# Patient Record
Sex: FEMALE | Race: BLACK OR AFRICAN AMERICAN
[De-identification: names, ages, dates, MRNs, and addresses within clinical notes are randomized per-mention and may not be internally consistent; named-entity substitution may affect disease eponyms.]

---

## 2018-01-07 ENCOUNTER — HOSPITAL ENCOUNTER (EMERGENCY)
Dept: HOSPITAL 74 - JER | Age: 53
Discharge: HOME | End: 2018-01-07
Payer: COMMERCIAL

## 2018-01-07 VITALS — DIASTOLIC BLOOD PRESSURE: 67 MMHG | SYSTOLIC BLOOD PRESSURE: 116 MMHG | HEART RATE: 84 BPM

## 2018-01-07 VITALS — BODY MASS INDEX: 25.7 KG/M2

## 2018-01-07 VITALS — TEMPERATURE: 97.8 F

## 2018-01-07 DIAGNOSIS — E11.9: ICD-10-CM

## 2018-01-07 DIAGNOSIS — Z79.84: ICD-10-CM

## 2018-01-07 DIAGNOSIS — Z87.09: ICD-10-CM

## 2018-01-07 DIAGNOSIS — R13.13: Primary | ICD-10-CM

## 2018-01-07 DIAGNOSIS — R09.89: ICD-10-CM

## 2018-01-07 LAB
ALBUMIN SERPL-MCNC: 3.4 G/DL (ref 3.4–5)
ALP SERPL-CCNC: 94 U/L (ref 45–117)
ALT SERPL-CCNC: 18 U/L (ref 12–78)
ANION GAP SERPL CALC-SCNC: 8 MMOL/L (ref 8–16)
AST SERPL-CCNC: 14 U/L (ref 15–37)
BASOPHILS # BLD: 0.7 % (ref 0–2)
BILIRUB SERPL-MCNC: 0.5 MG/DL (ref 0.2–1)
BUN SERPL-MCNC: 14 MG/DL (ref 7–18)
CALCIUM SERPL-MCNC: 8.6 MG/DL (ref 8.5–10.1)
CHLORIDE SERPL-SCNC: 99 MMOL/L (ref 98–107)
CO2 SERPL-SCNC: 30 MMOL/L (ref 21–32)
CREAT SERPL-MCNC: 0.8 MG/DL (ref 0.55–1.02)
DEPRECATED RDW RBC AUTO: 12.3 % (ref 11.6–15.6)
EOSINOPHIL # BLD: 1 % (ref 0–4.5)
GLUCOSE SERPL-MCNC: 237 MG/DL (ref 74–106)
HCT VFR BLD CALC: 40 % (ref 32.4–45.2)
HGB BLD-MCNC: 13.4 GM/DL (ref 10.7–15.3)
LYMPHOCYTES # BLD: 39 % (ref 8–40)
MCH RBC QN AUTO: 29.5 PG (ref 25.7–33.7)
MCHC RBC AUTO-ENTMCNC: 33.4 G/DL (ref 32–36)
MCV RBC: 88.4 FL (ref 80–96)
MONOCYTES # BLD AUTO: 6.2 % (ref 3.8–10.2)
NEUTROPHILS # BLD: 53.1 % (ref 42.8–82.8)
PLATELET # BLD AUTO: 233 K/MM3 (ref 134–434)
PMV BLD: 9.4 FL (ref 7.5–11.1)
POTASSIUM SERPLBLD-SCNC: 3.8 MMOL/L (ref 3.5–5.1)
PROT SERPL-MCNC: 7 G/DL (ref 6.4–8.2)
RBC # BLD AUTO: 4.52 M/MM3 (ref 3.6–5.2)
SODIUM SERPL-SCNC: 137 MMOL/L (ref 136–145)
WBC # BLD AUTO: 9.3 K/MM3 (ref 4–10)

## 2018-01-07 NOTE — DS
Physical Exam: 


SUBJECTIVE: Patient seen and examined





Pt is able to swallow and is breathing freely at this time, though she has a 

persistent feeling of something in her throat. She denies any other episodes of 

dysphagia before, ever having this type of sausage before, any other food 

allergies, headache, chest pain, abdominal pain, diarrhea, constipation, and 

dysuria. 





OBJECTIVE:





 Vital Signs











 Period  Temp  Pulse  Resp  BP Sys/Mccarthy  Pulse Ox


 


 Last 24 Hr  97.8 F  82-98  16-18  116-145/67-84  








PHYSICAL EXAM





GENERAL: Awake, alert, and fully oriented, in no acute distress.


HEENT: no pharyngeal erthema, no tongue, uvula or tonsillary swelling


NECK: Normal range of motion, supple without lymphadenopathy, JVD, or masses.


LUNGS: Breath sounds equal, clear to auscultation bilaterally. No wheezes, and 

no crackles. No accessory muscle use.


HEART: Regular rate and rhythm, normal S1 and S2 without murmur, rub or gallop.


ABDOMEN: Soft, nontender, not distended, normoactive bowel sounds, no guarding, 

no rebound, no masses.  No hepatomegaly or  splenomegaly. 


MUSCULOSKELETAL: Normal range of motion at all joints. No bony deformities or 

tenderness. No CVA tenderness.


UPPER EXTREMITIES: 2+ pulses, warm, well-perfused. No cyanosis. No clubbing. No 

peripheral edema.


LOWER EXTREMITIES: 2+ pulses, warm, well-perfused. No calf tenderness. No 

peripheral edema. 


NEUROLOGICAL:  Cranial nerves II-XII intact. Normal speech. Normal gait.





LABS


 Laboratory Results - last 24 hr











  01/07/18 01/07/18





  03:40 03:40


 


WBC  9.3 


 


RBC  4.52 


 


Hgb  13.4 


 


Hct  40.0 


 


MCV  88.4 


 


MCH  29.5 


 


MCHC  33.4 


 


RDW  12.3 


 


Plt Count  233 


 


MPV  9.4 


 


Neutrophils %  53.1 


 


Lymphocytes %  39.0 


 


Monocytes %  6.2 


 


Eosinophils %  1.0 


 


Basophils %  0.7 


 


Sodium   137


 


Potassium   3.8


 


Chloride   99


 


Carbon Dioxide   30


 


Anion Gap   8


 


BUN   14


 


Creatinine   0.8


 


Creat Clearance w eGFR   > 60


 


Random Glucose   237 H


 


Calcium   8.6


 


Total Bilirubin   0.5


 


AST   14 L


 


ALT   18


 


Alkaline Phosphatase   94


 


Total Protein   7.0


 


Albumin   3.4








CT scan of the neck without intravenous contrast:





Contiguous axial scans were obtained followed by coronal and sagittal 

reconstruction images. No radiopaque foreign body is seen. The airway is patent 

and symmetric without narrowing. Parapharyngeal space is clear. There are a 

couple of air pockets are seen in the soft tissue along the right lateral wall 

at the level of the piriform sinus/supralaryngeal level. It is unclear whether 

this is air within the effaced right. Form sinus versus extraluminal air. The 

thyroid gland is within normal limits in size. No discrete mass lesion or 

enlarged lymph nodes are identified. Both submandibular and both parotid glands 

appear unremarkable. Included intracranial contents appear grossly 

unremarkable. Both orbits appear unremarkable. Included thoracic inlet appears 

unremarkable. Straightening of the cervical spine with moderate degenerative 

disc disease at C5-C6 level with right uncovertebral hypertrophy moderately 

narrowing the right foramen. Mild to moderate degenerative disc disease at C6-

C7 level with mild bilateral uncovertebral hypertrophy Impression: No 

radiopaque foreign body is seen. There is no gross narrowing of the airway. 

There are a few air pockets in the right lateral supralaryngeal wall. It is 

unclear whether it represent air within the effaced right piriform sinus versus 

extraluminal air. Follow-up and ENT evaluation is needed. Mild air distention 

of the proximal esophagus, which is nonspecific. Correlate clinically for 

further evaluation. A preliminary report was forwarded by the [facility] service

, IMAGING ON CALL. 








HOSPITAL COURSE:





Date of Admission:01/07/18





Date of Discharge: 01/07/18





52F with hx of asthma and NIDDM (on metformin and glyburide) who presents after 

acute episode of dysphagia after eating sausage accompanied by emesis and SOB. 

The dysphagia resolved after several episodes of emesis which ejected the 

sausage completely out of her throat. Given the CT findings, ENT was consulted 

who stated that this is not urgent, and pt can be seen as an outpatient in 2 

days.





Pt is hemodynamically stable, has no further subjective complaints, has a 

normal physical exam, and is ready for discharge home to f/ with ENT in 2 

days. 





Case discussed with attending, Dr. Rich.





-Edmond Marcum MD PGY1


Minutes to complete discharge: 35





<Edmond Marcum - Last Filed: 01/07/18 13:49>


Physical Exam: 


SUBJECTIVE: Patient seen and examined, agree with the resident's note























<Valerie Rich - Last Filed: 01/08/18 13:17>





Discharge Summary


Reason For Visit: THROAT PAIN





- Home Medications


Comprehensive Discharge Medication List: 


Ambulatory Orders





Aspirin [ASA -] 81 mg PO DAILY 01/07/18 


Glyburide 5 mg PO QID 01/07/18 


Metformin HCl [Metformin HCl ER] 1,000 mg PO BID 01/07/18 











<Edmond Marcum - Last Filed: 01/07/18 13:49>





- Home Medications


Comprehensive Discharge Medication List: 


Ambulatory Orders





Aspirin [ASA -] 81 mg PO DAILY 01/07/18 


Glyburide 5 mg PO QID 01/07/18 


Metformin HCl [Metformin HCl ER] 1,000 mg PO BID 01/07/18 











<Valerie Rich - Last Filed: 01/08/18 13:17>


Condition: Guarded





- Instructions


Diet, Activity, Other Instructions: 


Follow up with ENT within next 2 days


Come back to the Emergency Department for any new, concerning or worsening 

symptom like difficulty breathing, swallowing, fever, chest pain...


Referrals: 


Ronn Erickson MD [Staff Physician] - 


Disposition: HOME


This patient is new to me today: Yes


Date on this admission: 01/07/18


Emergency Visit: Yes


Care time: The patient presented to the Emergency Department on the above date 

and was hospitalized for further evaluation of their emergent condition.


Critical Care patient: No





- Discharge Referral


Referred to [facility] No





<Edmond Marcum - Last Filed: 01/07/18 13:49>

## 2018-01-07 NOTE — PN
Teaching Attending Note


Name of Resident: Edmond Marcum





ATTENDING PHYSICIAN STATEMENT





I saw and evaluated the patient.


I reviewed the resident's note and discussed the case with the resident.


I agree with the resident's findings and plan as documented.








SUBJECTIVE:


Patient is feeling better, stated that she had a big piece of sausage that was 

stuck in her throat. Tried to drink water to push the sausage down her throat 

but instead she was throwing up. She decided to come to Ed.for further care. 

Patient was seen by ED.doctor ordered CT of the neck which was reported 

negative. ENT was called for the patient to be evaluated. As per ENT patient 

can go home and follow with the office on Monday. Patient was discharged home 

from ED. by the ED. doctor after the discussion with radiologist and ENT 

doctor. 





OBJECTIVE:





 Vital Signs











Temperature  97.8 F   01/07/18 00:13


 


Pulse Rate  84   01/07/18 09:00


 


Respiratory Rate  16   01/07/18 00:13


 


Blood Pressure  116/67   01/07/18 09:00


 


O2 Sat by Pulse Oximetry (%)  98   01/07/18 09:00











 CBCD











WBC  9.3 K/mm3 (4.0-10.0)   01/07/18  03:40    


 


RBC  4.52 M/mm3 (3.60-5.2)   01/07/18  03:40    


 


Hgb  13.4 GM/dL (10.7-15.3)   01/07/18  03:40    


 


Hct  40.0 % (32.4-45.2)   01/07/18  03:40    


 


MCV  88.4 fl (80-96)   01/07/18  03:40    


 


MCHC  33.4 g/dl (32.0-36.0)   01/07/18  03:40    


 


RDW  12.3 % (11.6-15.6)   01/07/18  03:40    


 


Plt Count  233 K/MM3 (134-434)   01/07/18  03:40    


 


MPV  9.4 fl (7.5-11.1)   01/07/18  03:40    








 CMP











Sodium  137 mmol/L (136-145)   01/07/18  03:40    


 


Potassium  3.8 mmol/L (3.5-5.1)   01/07/18  03:40    


 


Chloride  99 mmol/L ()   01/07/18  03:40    


 


Carbon Dioxide  30 mmol/L (21-32)   01/07/18  03:40    


 


Anion Gap  8  (8-16)   01/07/18  03:40    


 


BUN  14 mg/dL (7-18)   01/07/18  03:40    


 


Creatinine  0.8 mg/dL (0.55-1.02)   01/07/18  03:40    


 


Creat Clearance w eGFR  > 60  (>60)   01/07/18  03:40    


 


Random Glucose  237 mg/dL ()  H  01/07/18  03:40    


 


Calcium  8.6 mg/dL (8.5-10.1)   01/07/18  03:40    


 


Total Bilirubin  0.5 mg/dL (0.2-1.0)   01/07/18  03:40    


 


AST  14 U/L (15-37)  L  01/07/18  03:40    


 


ALT  18 U/L (12-78)   01/07/18  03:40    


 


Alkaline Phosphatase  94 U/L ()   01/07/18  03:40    


 


Total Protein  7.0 g/dl (6.4-8.2)   01/07/18  03:40    


 


Albumin  3.4 g/dl (3.4-5.0)   01/07/18  03:40    











 Home Medications











 Medication  Instructions  Recorded


 


Aspirin [ASA -] 81 mg PO DAILY 01/07/18


 


Glyburide 5 mg PO QID 01/07/18


 


Metformin HCl [Metformin HCl ER] 1,000 mg PO BID 01/07/18








PE: 


Patient has no difficulty swallowing, No swelling of her throat is noted. 


rest of PE per resident's note.





ASSESSMENT AND PLAN:


Patient is comfortable , advaced the  diet, patient tolerated the diet and was 

discharged home with follow up visit with ENT on Monday the 8th 2018. CT of the 

neck reviewed By Dr.Farid Guzman.





Follow with ENT Dr.Tom Brody on 1/8/2018

## 2018-01-07 NOTE — PDOC
History of Present Illness





- General


Chief Complaint: Sore Throat


Stated Complaint: THROAT PAIN


Time Seen by Provider: 01/07/18 00:52





- History of Present Illness


Initial Comments: 





01/07/18 01:18


Pt is a 53 y/o F with PMH Asthma, NIDDM on metformin who presents to the ED 

approximately 2-3 hours after an episode in which she had food stuck in her 

throat. Pt states she was eating sausage when a piece became lodged in her 

throat. She vomited 2-3 times over a period of 5 min before expelling the 

pieces. During the episode, pt states she felt like her throat was closing and 

she had trouble breathing.


Pt has had recurrent bouts of NBNB vomiting (3-4 times) since the episode. Pt 

is able to swallow and is breathing freely at this time, though she has a 

persistent feeling of something in her throat.

















Past History





- Past Medical History


Allergies/Adverse Reactions: 


 Allergies











Allergy/AdvReac Type Severity Reaction Status Date / Time


 


Penicillins Allergy   Verified 01/07/18 01:23














- Suicide/Smoking/Psychosocial Hx


Smoking History: Unknown if ever smoked


Information on smoking cessation initiated: No


Hx Alcohol Use: No


Drug/Substance Use Hx: No





**Review of Systems





- Review of Systems


Able to Perform ROS?: Yes


Is the patient limited English proficient: No


Constitutional: Yes: Symptoms Reported.  No: Chills, Diaphoresis, Fever


HEENTM: Yes: Symptoms Reported.  No: Eye Pain, Blurred Vision


Respiratory: Yes: Symptoms reported.  No: Cough, Orthopnea, Shortness of Breath


Cardiac (ROS): Yes: Symptoms Reported.  No: Chest Pain, Edema, Chest Tightness


ABD/GI: Yes: Symptoms Reported, See HPI, Difficulty Swallowing, Nausea, 

Vomiting.  No: Indigestion


: Yes: Symptoms Reported.  No: Burning, Dysuria, Discharge


Musculoskeletal: Yes: Symptoms Reported.  No: Back Pain





*Physical Exam





- Vital Signs


 Last Vital Signs











Temp Pulse Resp BP Pulse Ox


 


 97.8 F   82   16   145/71   100 


 


 01/07/18 00:13  01/07/18 00:13  01/07/18 00:13  01/07/18 00:13  01/07/18 00:13














- Physical Exam


General Appearance: Yes: Nourished, Appropriately Dressed.  No: Apparent 

Distress


HEENT: positive: EOMI, KISHOR, Muffled/Hoarse voice.  negative: Normal ENT 

Inspection, Pharynx Normal (swollen pharynx. Uvula resting on tongue)


Neck: positive: Supple.  negative: Tender, Stridor


Respiratory/Chest: positive: Normal Breath Sounds.  negative: Chest Tender, 

Lungs Clear


Cardiovascular: positive: Regular Rhythm, Regular Rate, S1, S2


Vascular Pulses: Dorsalis-Pedis (R): 2+, Doralis-Pedis (L): 2+


Gastrointestinal/Abdominal: positive: Normal Bowel Sounds, Flat, Soft.  negative

: Tender, Organomegaly


Musculoskeletal: positive: Normal Inspection.  negative: CVA Tenderness


Neurologic: positive: CNs II-XII NML intact, Fully Oriented, Alert, Normal Mood/

Affect





ED Treatment Course





- LABORATORY


CBC & Chemistry Diagram: 


 01/07/18 03:40





 01/07/18 03:40





Medical Decision Making





- Medical Decision Making





01/07/18 01:34





Pt is a 53 y/o F w/ PMH Asthma, NIDDM who present to ED several hours after an 

episode in which she had soft food stuck in her throat persistently for 5 min. 

Pt was able to expel the food, but has residual symptoms of altered voice, 

vomiting, and throat swelling.





Plan


-CT neck soft tissue











01/07/18 04:05


CT appears abnormal. Possible tracheal narrowing, ?gas in soft tissue. Possible 

infectious etiolgoy. Will confirm with radiology.


Pt may need ENT consult. 








01/07/18 05:39


Pt sleeping comfortably








01/07/18 06:41


Labs unremarkable.











*DC/Admit/Observation/Transfer





- Discharge Dispostion


Condition at time of disposition: Guarded





- Referrals





- Patient Instructions





- Post Discharge Activity

## 2018-01-07 NOTE — PDOC
*Physical Exam





- Vital Signs


 Last Vital Signs











Temp Pulse Resp BP Pulse Ox


 


 97.8 F   84   16   116/67   98 


 


 01/07/18 00:13  01/07/18 09:00  01/07/18 00:13  01/07/18 09:00  01/07/18 09:00














ED Treatment Course





- LABORATORY


CBC & Chemistry Diagram: 


 01/07/18 03:40





 01/07/18 03:40





- ADDITIONAL ORDERS


Additional order review: 


 Laboratory  Results











  01/07/18





  03:40


 


Sodium  137


 


Potassium  3.8


 


Chloride  99


 


Carbon Dioxide  30


 


Anion Gap  8


 


BUN  14


 


Creatinine  0.8


 


Creat Clearance w eGFR  > 60


 


Random Glucose  237 H


 


Calcium  8.6


 


Total Bilirubin  0.5


 


AST  14 L


 


ALT  18


 


Alkaline Phosphatase  94


 


Total Protein  7.0


 


Albumin  3.4








 











  01/07/18





  03:40


 


RBC  4.52


 


MCV  88.4


 


MCHC  33.4


 


RDW  12.3


 


MPV  9.4


 


Neutrophils %  53.1


 


Lymphocytes %  39.0


 


Monocytes %  6.2


 


Eosinophils %  1.0


 


Basophils %  0.7














Medical Decision Making





- Medical Decision Making





01/07/18 09:40


Official soft tissue neck CT: There may be minimal parapharyngeal space edema 

at and just above the level of the vocal cords.


Intraorbital and visualized contents are normal. No esophageal or tracheal 

foreign foreign bodies identified. No mass or abscess. Normal salivary glands 

and thyroid gland. Lungs are clear. Moderate degenerative changes of the 

cervical spine.


ENT on call consulted, patient can followed outpatient. 


01/07/18 09:42


No apparent soft tissue air as was communicated at sign out. Patient comfortable

, able to breathe, hungry, passed PO challenge. 


Will follow up with ENT outpatient. 





01/07/18 09:51





01/07/18 09:59


Patient placed on ED obs short stay. 





*DC/Admit/Observation/Transfer


Diagnosis at time of Disposition: 


 Pharyngeal edema








- Discharge Dispostion


Disposition: HOME


Condition at time of disposition: Guarded


Admit: Yes





- Referrals


Referrals: 


Ronn Erickson MD [Staff Physician] - 





- Patient Instructions


Printed Discharge Instructions:  DI for Oropharyngeal Dysphagia


Additional Instructions: 


Follow up with ENT within next 2 days


Come back to the Emergency Department for any new, concerning or worsening 

symptom like difficulty breathing, swallowing, fever, chest pain...





- Post Discharge Activity


Activity Comments: 





01/07/18 10:02


Short STay

## 2018-01-07 NOTE — PDOC
Attending Attestation





- Resident


Resident Name: Jaime Doherty





- ED Attending Attestation


I have performed the following: I have examined & evaluated the patient, The 

case was reviewed & discussed with the resident, I agree w/resident's findings 

& plan





- HPI


HPI: 





01/07/18 03:38


Pt choked on a sausage.  Spit up some of it at home; drank warm water to push 

it brian; choked on that and spit up more sausage.  Felt a FB in throat and came 

to the ER.  Here she vomited the rest of the sausage out.





- Physicial Exam


PE: 





01/07/18 05:13


Agree with resident exam.





- Medical Decision Making





01/07/18 03:33


Patient Name: JIHAN MCCARTY


THIS IS A PRELIMINARY REPORT FROM IMAGING ON CALL


DATE OF SERVICE: 2018-01-07 02:15:47


IMAGES: [id number]


EXAM: CT SOFT TISSUE NECK WITHOUT CONTRAST


HISTORY: Choked on sausage


COMPARISON: None.


FINDINGS: Intraorbital and visualized intracranial contents are normal. Normal 

epiglottis and vocal


cords. No parapharyngeal or retropharyngeal space edema. No esophageal or 

tracheal foreign


bodies identified. No mass or abscess. Normal salivary glands and thyroid 

gland. Lungs are clear.


There are moderate degenerative changes of the cervical spine.


IMPRESSION: No acute abnormalities identified


01/07/18 05:11


Pt has swelling at the level of the eppiglottis.  Images discussed with imaging 

on call, they agree that perhaps there may be some swelling in the area.


Pt will be signed out to the day team and ENT can be called to scope the 

patient.


Labs pending.


01/07/18 06:40


Labs normal. Pt will be signed out to the day team.

## 2018-01-07 NOTE — HP
CHIEF COMPLAINT: dysphagia





HISTORY OF PRESENT ILLNESS:





52F with hx of asthma and NIDDM (on metformin and glyburide), who presents to 

the ED approximately 2-3 hours after an episode in which she had food stuck in 

her throat. Pt states she was eating sausage when a piece became lodged in her 

throat. She vomited 2-3 times over a period of 5 min before expelling the 

pieces. During the episode, pt states she felt like her throat was closing and 

she had trouble breathing. Pt has had recurrent bouts of NBNB vomiting (3-4 

times) since the episode, which ejected the remainder of the sausage. Pt is 

able to swallow and is breathing freely at this time, though she has a 

persistent feeling of something in her throat. She denies any other episodes of 

dysphagia before, ever having this type of sausage before, any other food 

allergies, headache, chest pain, abdominal pain, diarrhea, constipation, and 

dysuria. 





ER course was notable for:


(1) CT findings





Recent Travel: denies





PAST MEDICAL HISTORY:





asthma and NIDDM





PAST SURGICAL HISTORY:





denies





Social History:


Smoking: denies


Alcohol: denies


Drugs: denies





Family History:





non-contributory





Allergies





Penicillins Allergy (Verified 01/07/18 01:23)


 


HOME MEDICATIONS:


 Home Medications











 Medication  Instructions  Recorded


 


Aspirin [ASA -] 81 mg PO DAILY 01/07/18


 


Glyburide 5 mg PO QID 01/07/18


 


Metformin HCl [Metformin HCl ER] 1,000 mg PO BID 01/07/18








REVIEW OF SYSTEMS


CONSTITUTIONAL: 


Absent:  fever, chills, diaphoresis, generalized weakness, malaise, loss of 

appetite, weight change


HEENT: 


Absent:  rhinorrhea, nasal congestion, mouth swelling, ear pain, eye pain, 

visual changes


present: throat swelling, difficulty swallowing


CARDIOVASCULAR: 


Absent: chest pain, syncope, palpitations, irregular heart rate, lightheadedness

, peripheral edema


RESPIRATORY: 


Absent: cough, dyspnea with exertion, orthopnea, wheezing, stridor, hemoptysis


present: SOB


GASTROINTESTINAL:


Absent: abdominal pain, abdominal distension, diarrhea, constipation, melena, 

hematochezia


present: nausea, emesis


GENITOURINARY: 


Absent: dysuria, frequency, urgency, hesitancy, hematuria, flank pain, genital 

pain


MUSCULOSKELETAL: 


Absent: myalgia, arthralgia, joint swelling, back pain, neck pain


SKIN: 


Absent: rash, itching, pallor


HEMATOLOGIC/IMMUNOLOGIC: 


Absent: easy bleeding, easy bruising, lymphadenopathy, frequent infections


ENDOCRINE:


Absent: unexplained weight gain, unexplained weight loss, heat intolerance, 

cold intolerance


NEUROLOGIC: 


Absent: headache, focal weakness or paresthesias, dizziness, unsteady gait, 

seizure, mental status changes, bladder or bowel incontinence


PSYCHIATRIC: 


Absent: anxiety, depression, suicidal or homicidal ideation, hallucinations.








PHYSICAL EXAMINATION


 Vital Signs - 24 hr











  01/07/18 01/07/18





  00:08 00:13


 


Temperature  97.8 F


 


Pulse Rate  82


 


Pulse Rate [ 98 H 





Left Radial]  


 


Respiratory 18 16





Rate  


 


Blood Pressure  145/71


 


Blood Pressure 136/84 





[Left Arm]  


 


O2 Sat by Pulse 98 100





Oximetry (%)  











GENERAL: Awake, alert, and fully oriented, in no acute distress.


HEENT: no pharyngeal erthema, no tongue, uvula or tonsillary swelling


NECK: Normal range of motion, supple without lymphadenopathy, JVD, or masses.


LUNGS: Breath sounds equal, clear to auscultation bilaterally. No wheezes, and 

no crackles. No accessory muscle use.


HEART: Regular rate and rhythm, normal S1 and S2 without murmur, rub or gallop.


ABDOMEN: Soft, nontender, not distended, normoactive bowel sounds, no guarding, 

no rebound, no masses.  No hepatomegaly or  splenomegaly. 


MUSCULOSKELETAL: Normal range of motion at all joints. No bony deformities or 

tenderness. No CVA tenderness.


UPPER EXTREMITIES: 2+ pulses, warm, well-perfused. No cyanosis. No clubbing. No 

peripheral edema.


LOWER EXTREMITIES: 2+ pulses, warm, well-perfused. No calf tenderness. No 

peripheral edema. 


NEUROLOGICAL:  Cranial nerves II-XII intact. Normal speech. Normal gait.


 


 Laboratory Results - last 24 hr











  01/07/18 01/07/18





  03:40 03:40


 


WBC  9.3 


 


RBC  4.52 


 


Hgb  13.4 


 


Hct  40.0 


 


MCV  88.4 


 


MCH  29.5 


 


MCHC  33.4 


 


RDW  12.3 


 


Plt Count  233 


 


MPV  9.4 


 


Neutrophils %  53.1 


 


Lymphocytes %  39.0 


 


Monocytes %  6.2 


 


Eosinophils %  1.0 


 


Basophils %  0.7 


 


Sodium   137


 


Potassium   3.8


 


Chloride   99


 


Carbon Dioxide   30


 


Anion Gap   8


 


BUN   14


 


Creatinine   0.8


 


Creat Clearance w eGFR   > 60


 


Random Glucose   237 H


 


Calcium   8.6


 


Total Bilirubin   0.5


 


AST   14 L


 


ALT   18


 


Alkaline Phosphatase   94


 


Total Protein   7.0


 


Albumin   3.4











THIS IS A PRELIMINARY REPORT FROM IMAGING ON CALL


DATE OF SERVICE: 2018-01-07 02:15:47


IMAGES: [id number]


EXAM: CT SOFT TISSUE NECK WITHOUT CONTRAST


HISTORY: Choked on sausage


COMPARISON: None.


FINDINGS: Intraorbital and visualized intracranial contents are normal. Normal 

epiglottis and vocal


cords. No parapharyngeal or retropharyngeal space edema. No esophageal or 

tracheal foreign


bodies identified. No mass or abscess. Normal salivary glands and thyroid 

gland. Lungs are clear.


There are moderate degenerative changes of the cervical spine.


IMPRESSION: No acute abnormalities identified





ASSESSMENT/PLAN:


52F with hx of asthma and NIDDM (on metformin and glyburide) who presents after 

acute episode of dysphagia after eating sausage accompanied by emesis and SOB.





#dysphagia- likely 2/2 to eating too large of a piece of sausage


   -resolved after several episodes of emesis which ejected sausage out of mouth


   -per ED note, there is some concerning epiglottis swelling that was 

discussed with radiology


   -ENT consult, Dr. Erickson, f/u recs


   -NPO pending ENT recs


   -NS @ 100cc/hr





#asthma- inactive


   -no current SOB or wheezing, satting well on RA





#NIDDM


   -ISS


   -BGM TIDAC





#FEN/ppx


   -NS @ 100cc/hr


   -electrolytes wnl


   -NPO for now


   -no GI ppx indicated


   -SCDs





Case discussed with attending, Dr. Rich.





-Edmond Marcum MD PGY1














Visit type





- Emergency Visit


Emergency Visit: Yes


Care time: The patient presented to the Emergency Department on the above date 

and was hospitalized for further evaluation of their emergent condition.





- New Patient


This patient is new to me today: Yes


Date on this admission: 01/07/18





- Critical Care


Critical Care patient: No